# Patient Record
Sex: FEMALE | Race: BLACK OR AFRICAN AMERICAN | NOT HISPANIC OR LATINO | Employment: FULL TIME | URBAN - METROPOLITAN AREA
[De-identification: names, ages, dates, MRNs, and addresses within clinical notes are randomized per-mention and may not be internally consistent; named-entity substitution may affect disease eponyms.]

---

## 2023-04-07 ENCOUNTER — TRANSCRIBE ORDERS (OUTPATIENT)
Dept: PHYSICAL THERAPY | Facility: CLINIC | Age: 63
End: 2023-04-07
Payer: OTHER MISCELLANEOUS

## 2023-04-07 DIAGNOSIS — S76.112A QUADRICEPS MUSCLE STRAIN, LEFT, INITIAL ENCOUNTER: ICD-10-CM

## 2023-04-07 DIAGNOSIS — S76.212A STRAIN OF ADDUCTOR MUSCLE, FASCIA AND TENDON OF LEFT THIGH, INITIAL ENCOUNTER: Primary | ICD-10-CM

## 2023-04-13 ENCOUNTER — TREATMENT (OUTPATIENT)
Dept: PHYSICAL THERAPY | Facility: CLINIC | Age: 63
End: 2023-04-13
Payer: OTHER MISCELLANEOUS

## 2023-04-13 DIAGNOSIS — S76.112A STRAIN OF LEFT QUADRICEPS, INITIAL ENCOUNTER: Primary | ICD-10-CM

## 2023-04-13 DIAGNOSIS — S76.212A STRAIN OF ADDUCTOR MAGNUS MUSCLE OF LEFT LOWER EXTREMITY, INITIAL ENCOUNTER: ICD-10-CM

## 2023-04-13 DIAGNOSIS — M79.652 THIGH PAIN, MUSCULOSKELETAL, LEFT: ICD-10-CM

## 2023-04-13 DIAGNOSIS — R26.2 DIFFICULTY IN WALKING: ICD-10-CM

## 2023-04-13 NOTE — PROGRESS NOTES
Physical Therapy Initial Evaluation and Plan of Care                                               [address], [address]                                                           [place], [place]                                                         [phone]    Patient: Renae Mclaughlin   : 1960  Diagnosis/ICD-10 Code:  Strain of left quadriceps, initial encounter [S76.112A]  Referring practitioner: Eric Blandon PA-C  Date of Initial Visit: 2023  Today's Date: 2023  Patient seen for 1 sessions           Subjective Questionnaire: LEFS: 43/80      Subjective Evaluation    History of Present Illness  Date of onset: 3/23/2023  Mechanism of injury: Pt reports that she was at a work function and was walking across a grass area when she tripped over a parking space block and immediately felt pain along her L thigh. She was not able to stand up or put weight on it immediatelly. She continues to feel significant stiffness and has difficulty moving her L LE due to pain. Ambulation remains painful and standing for long periods of time results in stiffness. Pain is worse at night. Her main complaint is L thigh stiffness which subsequently feels like a cramp and shooting pain down into her groin and thigh with attempt at movement.       Patient Occupation: [employer]   Precautions and Work Restrictions: Sit down duty; 2 weeks  is next oc med appointmentQuality of life: good    Pain  Current pain ratin  At best pain ratin  At worst pain rating: 10  Quality: cramping, pulling, sharp, tight, squeezing and radiating  Relieving factors: heat, change in position and medications (warm water and epsom salt soak; muscle relaxer )  Aggravating factors: ambulation, movement, sleeping, prolonged positioning and standing  Progression: worsening    Social Support  Lives in: one-story house  Lives with: alone    Diagnostic Tests  X-ray: normal    Treatments  Previous treatment: physical  "therapy (Summer of 2022 for lower back)  Current treatment: medication and physical therapy  Patient Goals  Patient goals for therapy: increased motion, decreased pain, return to sport/leisure activities, return to work and independence with ADLs/IADLs             Objective          Static Posture     Comments  Pt exhibits decreased weight shift onto LLE and verbally calls out due to pain when asked to weight bear equally through BLEs    Palpation   Left   No palpable tenderness to the distal biceps femoris, distal semimembranosus, distal semitendinosus, lateral gastrocnemius and medial gastrocnemius.   Muscle spasm in the adductor brevis, adductor longus and adductor ashley.   Tenderness of the adductor brevis, adductor longus and adductor ashley.     Tenderness     Left Hip   No tenderness in the greater trochanter and iliac crest.   Left Knee   No tenderness in the lateral joint line, lateral patella, medial joint line, medial patella, patellar tendon, popliteal fossa and quadriceps tendon.     Neurological Testing     Sensation     Hip   Left Hip   Intact: light touch    Right Hip   Intact: light touch    Knee   Left Knee   Intact: light touch    Right Knee   Intact: light touch     Active Range of Motion   Left Hip   Flexion: 20 (seated) degrees with pain  Abduction: with pain    Right Hip   Flexion: 90 degrees   Abduction: 60 degrees   Left Knee   Flexion: with pain  Extension: with pain    Right Knee   Normal active range of motion  Flexion: 135 degrees     Additional Active Range of Motion Details  Pt felt a \"cramp or stretch\" in LLE with all RLE motions as well  Pt unable to move through AROM in any plane while in supine this date     Strength/Myotome Testing     Left Hip   Planes of Motion   Flexion: 2-  Extension: 2-  Abduction: 2-    Right Hip   Planes of Motion   Flexion: WFL  Abduction: WFL    Left Knee   Flexion: 3-  Extension: 3-    Right Knee   Flexion: 5  Extension: 5    Additional Strength " "Details  Unable to assess MMT on L LE this date due to pain. Visible contraction of all muscles, however unable to move through full AROM in sitting or supine with LLE    Ambulation     Observational Gait   Gait: antalgic   Decreased walking speed, stride length, left stance time, left swing time and left step length.   Left foot contact pattern: foot flat  Base of support: decreased    Additional Observational Gait Details  Pt ambulates with antalgic gait through the LLE with trunk rotation to the L. She maintains L hip and knee extension, flexing only slightly to advance LLE forward during swing phase. Hip hike on the L during swing to advance the limb.           Assessment & Plan     Assessment  Impairments: abnormal gait, abnormal or restricted ROM, activity intolerance, impaired balance, impaired physical strength, lacks appropriate home exercise program, pain with function and weight-bearing intolerance  Functional Limitations: sleeping, walking, uncomfortable because of pain, moving in bed, sitting and standing  Assessment details: Renae is a 64 y/o female presenting to OP PT for initial evaluation following an incident at work on 3/30/23 in which she tripped over a parking spot block with her L foot, causing her to fall forward with her L foot trapped behind the block. She reports feeling a \"pop\" in her groin area and was unable to immediately bear weigh through the L extremity. Since then, she reports little to no change in her level of pain, though she is able to put enough weight on her LLE to ambulate. Pt ambulates throughout the clinic with significant antalgic gait through the LLE and little to no hip/ knee flexion throughout swing phase on the L, causing her to hip hike in order to advance her L limb. Evaluation was significantly hindered due to pt pain level this date. During evaluation, Renae was unable to perform AROM with her LLE in any plane when in supine without crying out in pain, but was able " to move through partial ROM in sitting, also with reports of pain. Pt was educated on the benefits and necessity for as much movement as possible throughout the day and was given isometric strengthening for HEP. Skilled OP PT services are deemed reasonable and necessary in order to address aforementioned functional limitations, deficits, and impairments so that pt may return to work independently and safely without restrictions.   Prognosis: good    Goals  Plan Goals: Short Term: 4 weeks  1. Pt will be independent with initial HEP  2. Pt will report >/= 75% reduction in pain in her L thigh  3. Pt will ambulate 100' independently with symmetrical gait mechanics WFL without any reports of pain for indication of improved ability to return to work with less restriction  4. Pt will demonstrate AROM of LLE in all planes WFL for improved ability to perform all ambulation, ADLs, and work duties independently and safely    Long Term Goals: 12 weeks  1. Pt will be independent with progressed HEP  2. Pt will report >/= 90% decrease in pain felt in the L thigh  3. Pt will perform 5X STS without UE assistance and without reports of pain in </=12 s for indication of improved BLE functional strength and ability to return to work without restriction  4. Pt will demonstrate improved LLE strength of >/= 4+/5 without reports of pain for indication of ability to perform all ADLs and work duties symmetrically and independently    Plan  Therapy options: will be seen for skilled therapy services  Planned modality interventions: cryotherapy, thermotherapy (hydrocollator packs), TENS and ultrasound  Planned therapy interventions: balance/weight-bearing training, functional ROM exercises, gait training, home exercise program, therapeutic activities, stretching, strengthening, neuromuscular re-education and manual therapy  Frequency: 2x week  Duration in weeks: 12  Treatment plan discussed with: patient        Manual Therapy:    0     mins   06811;  Therapeutic Exercise:    15     mins  41517;     Neuromuscular Braulio:    0    mins  06714;    Therapeutic Activity:     10     mins  74705;     Gait Trainin     mins  11833;     Ultrasound:     0     mins  85067;    Electrical Stimulation:    0     mins  41226 ( );  Dry Needling     0     mins self-pay    Timed Treatment:   25   mins   Total Treatment:     40   mins    PT SIGNATURE: Mika Anderson PT, DPT  [place] License #: [id number]   Mika Anderson PT, 23, 4:20 PM EDT  DATE TREATMENT INITIATED: 2023    Initial Certification  Certification Period: 2023  I certify that the therapy services are furnished while this patient is under my care.  The services outlined above are required by this patient, and will be reviewed every 90 days.     PHYSICIAN: Eric Blandon PA-C      DATE:     Please sign and return via fax to [phone].. Thank you, [facility].

## 2023-04-18 ENCOUNTER — TREATMENT (OUTPATIENT)
Dept: PHYSICAL THERAPY | Facility: CLINIC | Age: 63
End: 2023-04-18
Payer: OTHER MISCELLANEOUS

## 2023-04-18 DIAGNOSIS — S76.212A STRAIN OF ADDUCTOR MAGNUS MUSCLE OF LEFT LOWER EXTREMITY, INITIAL ENCOUNTER: ICD-10-CM

## 2023-04-18 DIAGNOSIS — S76.112A STRAIN OF LEFT QUADRICEPS, INITIAL ENCOUNTER: Primary | ICD-10-CM

## 2023-04-18 DIAGNOSIS — M79.652 THIGH PAIN, MUSCULOSKELETAL, LEFT: ICD-10-CM

## 2023-04-18 DIAGNOSIS — R26.2 DIFFICULTY IN WALKING: ICD-10-CM

## 2023-04-18 NOTE — PROGRESS NOTES
Physical Therapy Daily Progress Note                                            [address], [address]                                                           [place], [place]                                                         [phone]    Patient: Renae Mclaughlin   : 1960  Diagnosis/ICD-10 Code:  Strain of left quadriceps, initial encounter [S76.112A]  Referring practitioner: Eric Blandon PA-C  Date of Initial Visit: Type: THERAPY  Noted: 2023  Today's Date: 2023  Patient seen for 2 sessions           Subjective Evaluation    History of Present Illness    Subjective comment: Pt reports that she is slightly better this date. She states that she continues to experience sharp/ shooting pain when she steps down from a curb or small step. She has been compliant with HEP but states that it does increase her soreness at night. Heat or warm bath alleviates pain/ soreness.        Objective   See Exercise, Manual, and Modality Logs for complete treatment.       Assessment & Plan     Assessment    Assessment details: Renae entered treatment session reporting improvement in pain and demonstrated slight improvement in gait mechanics though still displays antalgic gait through LLE. She ambulated with a faster sachin this session indicating decreased pain throughout LLE with weightbearing. Pt also was able to demonstrate improved L hip AROM throughout session during all exercises and during all table mobility and transfers with less reports of pain. She reported improved motion and decreased pain following heat application at beginning of session. Plan to progress all gentle stretching and strengthening as tolerated next session.         Progress per Plan of Care           Manual Therapy:    0     mins  57327;  Therapeutic Exercise:    35     mins  80602;     Neuromuscular Braulio:    10    mins  07441;    Therapeutic Activity:     0     mins  47811;     Gait Trainin     mins  42392;      Ultrasound:     0     mins  62300;    Electrical Stimulation:    0     mins  84353 ( );  Dry Needling     0     mins self-pay    Timed Treatment:   45   mins   Total Treatment:     55   mins    Mika Anderson PT, DPT  Physical Therapist  [place] License #: [id number]  Mika Anderson PT, 04/18/23, 12:37 PM EDT

## 2023-04-20 ENCOUNTER — TREATMENT (OUTPATIENT)
Dept: PHYSICAL THERAPY | Facility: CLINIC | Age: 63
End: 2023-04-20
Payer: OTHER MISCELLANEOUS

## 2023-04-20 DIAGNOSIS — M79.652 THIGH PAIN, MUSCULOSKELETAL, LEFT: ICD-10-CM

## 2023-04-20 DIAGNOSIS — S76.212A STRAIN OF ADDUCTOR MAGNUS MUSCLE OF LEFT LOWER EXTREMITY, INITIAL ENCOUNTER: ICD-10-CM

## 2023-04-20 DIAGNOSIS — R26.2 DIFFICULTY IN WALKING: ICD-10-CM

## 2023-04-20 DIAGNOSIS — S76.112A STRAIN OF LEFT QUADRICEPS, INITIAL ENCOUNTER: Primary | ICD-10-CM

## 2023-04-20 NOTE — PROGRESS NOTES
"   Physical Therapy Daily Progress Note                                            [address], [address]                                                           [place], [place]                                                         [phone]    Patient: Renae Mclaughlin   : 1960  Diagnosis/ICD-10 Code:  Strain of left quadriceps, initial encounter [S76.112A]  Referring practitioner: Eric Blandon PA-C  Date of Initial Visit: Type: THERAPY  Noted: 2023  Today's Date: 2023  Patient seen for 3 sessions           Subjective Evaluation    History of Present Illness  Mechanism of injury: Renae reports feeling about the same; she continues to feel significant stiffness throughout the day. She does feel that her walking is better and she is able to walk faster even though she continues to have antalgic gait.           Objective   See Exercise, Manual, and Modality Logs for complete treatment.       Assessment & Plan     Assessment    Assessment details: Renae demonstrated significant improvement in her AROM and hip/ knee strength throughout her L LE this date during all exercises. She tolerated new strengthening and AROM exercises well without reports of increased pain, indicating further improvements in L LE AROM and strength as well as appropriate healing of the L adductor muscle. Pt also demonstrated improved gait mechanics throughout the clinic with increased sachin and improved hip flexion throughout swing phase on the L, however she continues to ambulate with antalgic gait through the L due to \"stiffness.\"        Progress per Plan of Care           Manual Therapy:    0     mins  93463;  Therapeutic Exercise:    36     mins  28689;     Neuromuscular Braulio:    10    mins  67745;    Therapeutic Activity:     0     mins  09868;     Gait Trainin     mins  49005;     Ultrasound:     0     mins  91103;    Electrical Stimulation:    0     mins  15139 ( );  Dry Needling     0     " mins self-pay    Timed Treatment:   46   mins   Total Treatment:     56   mins    Mika Anderson PT, DPT  Physical Therapist  [place] License #: [id number]  Mika Anderson PT, 04/20/23, 10:36 AM EDT

## 2023-04-24 ENCOUNTER — TREATMENT (OUTPATIENT)
Dept: PHYSICAL THERAPY | Facility: CLINIC | Age: 63
End: 2023-04-24
Payer: OTHER MISCELLANEOUS

## 2023-04-24 DIAGNOSIS — R26.2 DIFFICULTY IN WALKING: ICD-10-CM

## 2023-04-24 DIAGNOSIS — M79.652 THIGH PAIN, MUSCULOSKELETAL, LEFT: ICD-10-CM

## 2023-04-24 DIAGNOSIS — S76.212A STRAIN OF ADDUCTOR MAGNUS MUSCLE OF LEFT LOWER EXTREMITY, INITIAL ENCOUNTER: ICD-10-CM

## 2023-04-24 DIAGNOSIS — S76.112A STRAIN OF LEFT QUADRICEPS, INITIAL ENCOUNTER: Primary | ICD-10-CM

## 2023-04-24 NOTE — PROGRESS NOTES
"   Physical Therapy Daily Progress Note                                            [address], [address]                                                           [place], [place]                                                         [phone]    Patient: Renae Mclaughlin   : 1960  Diagnosis/ICD-10 Code:  Strain of left quadriceps, initial encounter [S76.112A]  Referring practitioner: Eric Blandon PA-C  Date of Initial Visit: Type: THERAPY  Noted: 2023  Today's Date: 2023  Patient seen for 4 sessions           Subjective Evaluation    History of Present Illness  Mechanism of injury: Renae reports improvement each day, she states \"each day I feel less pain.\" She reports that she walked to the dollar store this weekend, but did have to stop to take breaks along the way. Her HEP continues to assist with decreasing pain and improving mobility.           Objective   See Exercise, Manual, and Modality Logs for complete treatment.       Assessment & Plan     Assessment    Assessment details: Renae continues to demonstrate improved L hip AROM and strength with each treatment session. She reports feeling decreased pain and improved ambulation with each passing day as well. Pt demonstrated improved LLE strength by progressing hip flexor strengthening activity from a quad set to SLR, though she exhibited decreased hip flexion ROM on L due to pain and \"pulling\" sensation at end range. She is also now able to perform BUE assisted SLS and has no complaints of pain during side stepping activity, indicating further L hip ROM and strength improvements. Plan to continue progressing LLE strength and ROM activities as tolerated.         Progress per Plan of Care           Manual Therapy:    0     mins  10548;  Therapeutic Exercise:    42     mins  14326;     Neuromuscular Braulio:    0    mins  83484;    Therapeutic Activity:     0     mins  75898;     Gait Trainin     mins  60254;     Ultrasound:   "   0     mins  07776;    Electrical Stimulation:    0     mins  96665 ( );  Dry Needling     0     mins self-pay    Timed Treatment:   42   mins   Total Treatment:     52   mins    Mika Anderson PT, DPT  Physical Therapist  [place] License #: [id number]  Mika Anderson PT, 04/24/23, 12:54 PM EDT

## 2023-04-26 ENCOUNTER — TREATMENT (OUTPATIENT)
Dept: PHYSICAL THERAPY | Facility: CLINIC | Age: 63
End: 2023-04-26
Payer: OTHER MISCELLANEOUS

## 2023-04-26 DIAGNOSIS — R26.2 DIFFICULTY IN WALKING: ICD-10-CM

## 2023-04-26 DIAGNOSIS — M79.652 THIGH PAIN, MUSCULOSKELETAL, LEFT: ICD-10-CM

## 2023-04-26 DIAGNOSIS — S76.112A STRAIN OF LEFT QUADRICEPS, INITIAL ENCOUNTER: Primary | ICD-10-CM

## 2023-04-26 DIAGNOSIS — S76.212A STRAIN OF ADDUCTOR MAGNUS MUSCLE OF LEFT LOWER EXTREMITY, INITIAL ENCOUNTER: ICD-10-CM

## 2023-04-26 NOTE — PROGRESS NOTES
"   Physical Therapy Daily Progress Note                                            [address], [address]                                                           [place], [place]                                                         [phone]    Patient: Renae Mclaughlin   : 1960  Diagnosis/ICD-10 Code:  Strain of left quadriceps, initial encounter [S76.112A]  Referring practitioner: Eric Blandon PA-C  Date of Initial Visit: Type: THERAPY  Noted: 2023  Today's Date: 2023  Patient seen for 5 sessions           Subjective Evaluation    History of Present Illness  Mechanism of injury: Renae reports feeling better today and believes that she is walking \"better and faster.\" She states that she still experiences stiffness after prolonged rest.           Objective   See Exercise, Manual, and Modality Logs for complete treatment.       Assessment & Plan     Assessment    Assessment details: Renae continues to demonstrate improvement in L LE AROM and strength. She was able to participate in sidelying clamshell this date without reports of pain, but had new onset of pain reported in L hip adductor with supine SLR exercise this session, indicating persisting deficits in L hip flexibility. Pt reported that she has to ascend/ descend several flights of stairs for work, if she is to be cleared to return today, and she had not yet attempted to navigate stairs. Pt educated and shown how to descend steps with LLE lead so as to not stretch her L adductor muscle as much as when she attempts to descend with RLE lead, which causes her pain. She will practice ascending/ descending stairs as tolerated.         Progress per Plan of Care           Manual Therapy:    0     mins  21291;  Therapeutic Exercise:    42     mins  38924;     Neuromuscular Braulio:    0    mins  28168;    Therapeutic Activity:     0     mins  73348;     Gait Trainin     mins  05120;     Ultrasound:     0     mins  07175;  "   Electrical Stimulation:    0     mins  93292 ( );  Dry Needling     0     mins self-pay    Timed Treatment:   42   mins   Total Treatment:     52   mins    Mika Anderson PT, DPT  Physical Therapist  [place] License #: [id number]  Mika Anderson PT, 04/26/23, 1:51 PM EDT

## 2023-05-02 ENCOUNTER — TELEPHONE (OUTPATIENT)
Dept: PHYSICAL THERAPY | Facility: CLINIC | Age: 63
End: 2023-05-02

## 2023-05-04 ENCOUNTER — TREATMENT (OUTPATIENT)
Dept: PHYSICAL THERAPY | Facility: CLINIC | Age: 63
End: 2023-05-04
Payer: OTHER MISCELLANEOUS

## 2023-05-04 DIAGNOSIS — S76.112A STRAIN OF LEFT QUADRICEPS, INITIAL ENCOUNTER: Primary | ICD-10-CM

## 2023-05-04 DIAGNOSIS — M79.652 THIGH PAIN, MUSCULOSKELETAL, LEFT: ICD-10-CM

## 2023-05-04 DIAGNOSIS — R26.2 DIFFICULTY IN WALKING: ICD-10-CM

## 2023-05-04 DIAGNOSIS — S76.212A STRAIN OF ADDUCTOR MAGNUS MUSCLE OF LEFT LOWER EXTREMITY, INITIAL ENCOUNTER: ICD-10-CM

## 2023-05-04 NOTE — PROGRESS NOTES
Physical Therapy Daily Progress Note                                            [address], [address]                                                           [place], [place]                                                         [phone]    Patient: Renae Mclaughlin   : 1960  Diagnosis/ICD-10 Code:  Strain of left quadriceps, initial encounter [S76.112A]  Referring practitioner: Eric Blandon PA-C  Date of Initial Visit: Type: THERAPY  Noted: 2023  Today's Date: 2023  Patient seen for 6 sessions           Subjective Evaluation    History of Present Illness    Subjective comment: Renae reports that she is doing very well. She states that she has been doing her exercises at home and they continue to help. She still has difficulty descending stairs but is taking it slow and making sure to use the handrail.        Objective   See Exercise, Manual, and Modality Logs for complete treatment.       Assessment & Plan     Assessment    Assessment details: Pt completed all therapeutic exercise with minimal complaints of pain this date. She had slight reports of pain with hip adduction isometrics this date, so isometric hold was discontinued and concentric squeeze was done to avoid increase in pain. Pt continues to ambulate with slight antalgic gait through the E but pattern was more symmetrical this date and sachin was increased. Initiation of hip flexor stretching was done at the end of the session to assist with decreasing stiffness felt in anterior thigh muscles throughout the day.         Progress per Plan of Care           Manual Therapy:    0     mins  01147;  Therapeutic Exercise:    45     mins  60548;     Neuromuscular Braulio:    0    mins  65269;    Therapeutic Activity:     0     mins  03484;     Gait Trainin     mins  82623;     Ultrasound:     0     mins  53483;    Electrical Stimulation:    0     mins  31646 ( );  Dry Needling     0     mins self-pay    Timed  Treatment:   45   mins   Total Treatment:     45   mins    Mika Anderson PT, DPT  Physical Therapist  [place] License #: [id number]  Mika Anderson PT, 05/04/23, 10:44 AM EDT

## 2023-05-09 ENCOUNTER — TREATMENT (OUTPATIENT)
Dept: PHYSICAL THERAPY | Facility: CLINIC | Age: 63
End: 2023-05-09
Payer: OTHER MISCELLANEOUS

## 2023-05-09 DIAGNOSIS — S76.112A STRAIN OF LEFT QUADRICEPS, INITIAL ENCOUNTER: Primary | ICD-10-CM

## 2023-05-09 DIAGNOSIS — S76.212A STRAIN OF ADDUCTOR MAGNUS MUSCLE OF LEFT LOWER EXTREMITY, INITIAL ENCOUNTER: ICD-10-CM

## 2023-05-09 DIAGNOSIS — M79.652 THIGH PAIN, MUSCULOSKELETAL, LEFT: ICD-10-CM

## 2023-05-09 DIAGNOSIS — R26.2 DIFFICULTY IN WALKING: ICD-10-CM

## 2023-05-09 NOTE — PROGRESS NOTES
"   Physical Therapy Daily Progress Note                                            [address], [address]                                                           [place], [place]                                                         [phone]    Patient: Renae Mclaughlin   : 1960  Diagnosis/ICD-10 Code:  Strain of left quadriceps, initial encounter [S76.112A]  Referring practitioner: Eric Blandon PA-C  Date of Initial Visit: Type: THERAPY  Noted: 2023  Today's Date: 2023  Patient seen for 7 sessions           Subjective Evaluation    History of Present Illness    Subjective comment: Ms. Macdonald reports that she is feeling good this date. She has been able to participate in her normal daily activities without increased pain and has been navigating stairs without pain as well.        Objective   See Exercise, Manual, and Modality Logs for complete treatment.       Assessment & Plan     Assessment    Assessment details: Renae presented to treatment session this date reporting no pain and that her L hip pain has improved significantly. She continues to ambulate on even surfaces throughout the clinic with slightly antalgic gait through the LLE, but reports that she \"does not know why she is still walking that way.\" Pt was educated on importance of re-training the body to have symmetrical gait so that her LLE does not become stiff again. Renae was able to complete all strengthening and stretching exercises this session without reports of increased pain. She also participated in anterior step ups without UE assistance this date, indicating improvement in LLE strength and AROM.         Progress per Plan of Care           Manual Therapy:    0     mins  82331;  Therapeutic Exercise:    42     mins  88928;     Neuromuscular Braulio:    0    mins  84561;    Therapeutic Activity:     0     mins  46737;     Gait Trainin     mins  12407;     Ultrasound:     0     mins  79334;    Electrical " Stimulation:    0     mins  54721 ( );  Dry Needling     0     mins self-pay    Timed Treatment:   42   mins   Total Treatment:     52   mins    Mika Anderson PT, DPT  Physical Therapist  [place] License #: [id number]  Mika Anderson PT, 05/09/23, 12:53 PM EDT

## 2023-06-07 ENCOUNTER — DOCUMENTATION (OUTPATIENT)
Dept: PHYSICAL THERAPY | Facility: CLINIC | Age: 63
End: 2023-06-07
Payer: OTHER MISCELLANEOUS